# Patient Record
Sex: FEMALE | URBAN - METROPOLITAN AREA
[De-identification: names, ages, dates, MRNs, and addresses within clinical notes are randomized per-mention and may not be internally consistent; named-entity substitution may affect disease eponyms.]

---

## 2024-09-20 ENCOUNTER — OFFICE VISIT (OUTPATIENT)
Dept: URBAN - METROPOLITAN AREA CLINIC 35 | Facility: CLINIC | Age: 34
End: 2024-09-20

## 2024-09-20 NOTE — HPI-HOSPITAL FOLLOWUP
Patient presents for follow up from ER visit at [facility] on 9/18. Patient presented to the ER with nausea, vomiting, and loss of appetite. She had US and labs completed with results below. Pt admits/denies continued abdominal pain. Patient admits he has been experiencing symptoms since ____. Patient describes symptoms as ____. Patient states symptoms are located ___. Patient admits symptoms are more present during /after ____. Patient admits /denies nausea or vomiting.  LABS Creatinine - 0.56 L AST - 8 L Bilirubin - 1.2 H Lipase - 9 L HgB - 15.0 H Hematocrit - 42.3 H Neutrophils - 7.12 H   ABD US IMPRESSION: 1.  Unremarkable right upper quadrant ultrasound. 2.  No cholelithiasis or current sonographic evidence for acute cholecystitis.

## 2024-09-21 ENCOUNTER — P2P PATIENT RECORD (OUTPATIENT)
Age: 34
End: 2024-09-21

## 2024-09-24 ENCOUNTER — DASHBOARD ENCOUNTERS (OUTPATIENT)
Age: 34
End: 2024-09-24

## 2024-09-25 ENCOUNTER — OFFICE VISIT (OUTPATIENT)
Dept: URBAN - METROPOLITAN AREA CLINIC 35 | Facility: CLINIC | Age: 34
End: 2024-09-25

## 2024-09-25 ENCOUNTER — TELEPHONE ENCOUNTER (OUTPATIENT)
Dept: URBAN - METROPOLITAN AREA CLINIC 35 | Facility: CLINIC | Age: 34
End: 2024-09-25

## 2024-09-25 NOTE — HPI-HOSPITAL FOLLOWUP
Patient presents for follow up from ER visit at [facility] on 9/18. Patient presented to the ER with nausea, vomiting, and loss of appetite. She had US and labs completed with results below. Pt admits/denies continued abdominal pain. Patient admits he has been experiencing symptoms since ____. Patient describes symptoms as ____. Patient states symptoms are located ___. Patient admits symptoms are more present during /after ____. Patient admits /denies nausea or vomiting.  LABS  CMP Creatinine - 0.56 L AST - 8 L Bilirubin - 1.2 H Lipase - 9 L All other values within normal limits CBC HgB - 15.0 H Hematocrit - 42.3 H Neutrophils - 7.12 H All other values within normal limits   ABD US IMPRESSION: 1.  Unremarkable right upper quadrant ultrasound. 2.  No cholelithiasis or current sonographic evidence for acute cholecystitis.